# Patient Record
Sex: FEMALE
[De-identification: names, ages, dates, MRNs, and addresses within clinical notes are randomized per-mention and may not be internally consistent; named-entity substitution may affect disease eponyms.]

---

## 2019-02-11 PROBLEM — Z00.00 ENCOUNTER FOR PREVENTIVE HEALTH EXAMINATION: Status: ACTIVE | Noted: 2019-02-11

## 2019-03-15 ENCOUNTER — APPOINTMENT (OUTPATIENT)
Dept: ENDOCRINOLOGY | Facility: CLINIC | Age: 24
End: 2019-03-15

## 2019-04-29 ENCOUNTER — APPOINTMENT (OUTPATIENT)
Dept: ENDOCRINOLOGY | Facility: CLINIC | Age: 24
End: 2019-04-29
Payer: COMMERCIAL

## 2019-04-29 VITALS
HEART RATE: 76 BPM | HEIGHT: 64 IN | SYSTOLIC BLOOD PRESSURE: 116 MMHG | WEIGHT: 193 LBS | DIASTOLIC BLOOD PRESSURE: 80 MMHG | BODY MASS INDEX: 32.95 KG/M2

## 2019-04-29 DIAGNOSIS — Z87.39 PERSONAL HISTORY OF OTHER DISEASES OF THE MUSCULOSKELETAL SYSTEM AND CONNECTIVE TISSUE: ICD-10-CM

## 2019-04-29 DIAGNOSIS — E28.2 POLYCYSTIC OVARIAN SYNDROME: ICD-10-CM

## 2019-04-29 DIAGNOSIS — Z87.891 PERSONAL HISTORY OF NICOTINE DEPENDENCE: ICD-10-CM

## 2019-04-29 PROCEDURE — 99204 OFFICE O/P NEW MOD 45 MIN: CPT

## 2019-04-29 RX ORDER — SPIRONOLACTONE 50 MG/1
TABLET ORAL
Refills: 0 | Status: ACTIVE | COMMUNITY

## 2019-04-29 RX ORDER — BIOTIN 10 MG
TABLET ORAL
Refills: 0 | Status: ACTIVE | COMMUNITY

## 2019-04-29 RX ORDER — ASCORBIC ACID 500 MG
TABLET ORAL
Refills: 0 | Status: ACTIVE | COMMUNITY

## 2019-04-29 RX ORDER — BUPROPION HYDROCHLORIDE 150 MG/1
150 TABLET, EXTENDED RELEASE ORAL
Qty: 60 | Refills: 2 | Status: ACTIVE | COMMUNITY
Start: 2019-04-29 | End: 1900-01-01

## 2019-04-30 LAB
ALBUMIN SERPL ELPH-MCNC: 4.8 G/DL
ALP BLD-CCNC: 70 U/L
ALT SERPL-CCNC: 18 U/L
ANION GAP SERPL CALC-SCNC: 13 MMOL/L
AST SERPL-CCNC: 19 U/L
BASOPHILS # BLD AUTO: 0.06 K/UL
BASOPHILS NFR BLD AUTO: 0.9 %
BILIRUB SERPL-MCNC: 0.4 MG/DL
BUN SERPL-MCNC: 10 MG/DL
CALCIUM SERPL-MCNC: 10.1 MG/DL
CHLORIDE SERPL-SCNC: 102 MMOL/L
CHOLEST SERPL-MCNC: 267 MG/DL
CHOLEST/HDLC SERPL: 4.6 RATIO
CO2 SERPL-SCNC: 25 MMOL/L
CREAT SERPL-MCNC: 0.81 MG/DL
EOSINOPHIL # BLD AUTO: 0.06 K/UL
EOSINOPHIL NFR BLD AUTO: 0.9 %
ESTIMATED AVERAGE GLUCOSE: 94 MG/DL
GLUCOSE SERPL-MCNC: 85 MG/DL
HBA1C MFR BLD HPLC: 4.9 %
HCT VFR BLD CALC: 47 %
HDLC SERPL-MCNC: 58 MG/DL
HGB BLD-MCNC: 15.6 G/DL
IMM GRANULOCYTES NFR BLD AUTO: 0.2 %
LDLC SERPL CALC-MCNC: 192 MG/DL
LYMPHOCYTES # BLD AUTO: 2.79 K/UL
LYMPHOCYTES NFR BLD AUTO: 43.7 %
MAN DIFF?: NORMAL
MCHC RBC-ENTMCNC: 29.5 PG
MCHC RBC-ENTMCNC: 33.2 GM/DL
MCV RBC AUTO: 89 FL
MONOCYTES # BLD AUTO: 0.41 K/UL
MONOCYTES NFR BLD AUTO: 6.4 %
NEUTROPHILS # BLD AUTO: 3.06 K/UL
NEUTROPHILS NFR BLD AUTO: 47.9 %
PLATELET # BLD AUTO: 355 K/UL
POTASSIUM SERPL-SCNC: 4.6 MMOL/L
PROLACTIN SERPL-MCNC: 6.2 NG/ML
PROT SERPL-MCNC: 7.1 G/DL
RBC # BLD: 5.28 M/UL
RBC # FLD: 12.7 %
SODIUM SERPL-SCNC: 140 MMOL/L
T4 FREE SERPL-MCNC: 1.5 NG/DL
TESTOST SERPL-MCNC: 54.4 NG/DL
TRIGL SERPL-MCNC: 83 MG/DL
TSH SERPL-ACNC: 1.54 UIU/ML
WBC # FLD AUTO: 6.39 K/UL

## 2019-04-30 RX ORDER — NORGESTIMATE AND ETHINYL ESTRADIOL 0.25-0.035
0.25-35 KIT ORAL DAILY
Qty: 3 | Refills: 3 | Status: ACTIVE | COMMUNITY
Start: 1900-01-01 | End: 1900-01-01

## 2019-05-03 LAB — 17OHP SERPL-MCNC: 59 NG/DL

## 2019-05-06 LAB — DHEA-SULFATE, SERUM: 347 UG/DL

## 2019-05-06 NOTE — HISTORY OF PRESENT ILLNESS
[FreeTextEntry1] : Ms. Mckoy is a 23 year-old woman with a history of polycystic ovarian syndrome and elevated body mass index presenting to establish care with me.\par \par Polycystic ovarian syndrome.\par She was diagnosed with polycystic ovarian syndrome in high school in the setting of oligomenorrhea, hirsutism, acne.\par She has been on a combined oral contraceptive pill for a few years. She has been on Sprintec for the past year. She has not been regular with use since she is not sexually active but has been compliant over the past two months and has had regular withdrawal bleeding.\par She has been on spironolactone once a day for about a year (does not remember dose) for hirsutism and acne. She shaves hirsutism above lip and chin. She was previously on minocycline for acne. Some benefit but still continued acne with spironolactone. She is seeing her dermatologist soon. \par \par Elevated body mass index.\par She weighed up to 140 pounds the first few years of high school, then up to 150 pounds prior to graduation.\par She gained weight during college. Her weight was up to 200 pounds, decreased with lifestyle modifications.\par She was previously on metformin for weight loss but did not tolerate.\par 24 hour diet recall: breakfast, coffee, will have a One bar; lunch, Poke bowl; dinner Lobster bisque, steak (graduation)\par Exercise: Walking around campus; planning to run around the [facility]\par \par She graduated yesterday. She will be studying special education/general education in graduate school at [employer] at [place].

## 2019-05-06 NOTE — ADDENDUM
[FreeTextEntry1] : Recent test results as below. Hemoglobin/hematocrit are borderline elevated; question if due to hemoconcentration. Recommend follow-up with primary care. LDL is elevated and may improve with weight loss. Due to age, statin therapy not indicated. Testosterone remains elevated on oral contraceptive pill and spironolactone; recommend increase in dose. Other test results within range. Await 17-hydroxyprogesterone and DHEAS to discuss with Ms. Mckoy. 4/30/19\par \par 17-hydroxyprogesterone and DHEAS within range. I left a message and will send a letter to Ms. Mckoy with results. 5/06/19

## 2019-05-06 NOTE — ASSESSMENT
[FreeTextEntry1] : Polycystic ovarian syndrome. She meets criteria for polycystic ovarian syndrome in the setting of irregular periods and clinical evidence of hyperandrogenism. We discussed evaluation for secondary causes of oligomenorrhea. We discussed that first line therapy for polycystic ovarian syndrome is a combined oral contraceptive pill. She is taking an oral contraceptive pill with norethindrone acetate, with moderate androgenic activity but low thromboembolic risk. We can adjust spironolactone to twice daily for treatment of acne. We discussed that weight loss should improve her symptoms. \par Laboratory testing as below\par Continue Sprintec\par Adjust dose of spironolactone to twice daily pending potassium level\par \par Elevated body mass index. We reviewed lifestyle modifications for weight loss. We discussed referral to nutrition. We discussed pharmacologic options for weight loss. She is amenable to bupropion. We discussed the risks and benefits, including but not limited to nausea, headache, dizziness, decrease in seizure threshold.\par Laboratory testing as below\par Lifestyle modification\par Referral to nutrition\par Start bupropion  mg daily for one week, then 300 mg daily\par \par Return to see me in 3 months.

## 2019-05-06 NOTE — PHYSICAL EXAM
[Alert] : alert [No Acute Distress] : no acute distress [Healthy Appearance] : healthy appearance [Normal Sclera/Conjunctiva] : normal sclera/conjunctiva [Normal Oropharynx] : the oropharynx was normal [No Neck Mass] : no neck mass was observed [Supple] : the neck was supple [No LAD] : no lymphadenopathy [Thyroid Not Enlarged] : the thyroid was not enlarged [No Thyroid Nodules] : there were no palpable thyroid nodules [Normal Rate and Effort] : normal respiratory rhythm and effort [Clear to Auscultation] : lungs were clear to auscultation bilaterally [Normal Rate] : heart rate was normal  [Normal S1, S2] : normal S1 and S2 [Regular Rhythm] : with a regular rhythm [No Stigmata of Cushings Syndrome] : no stigmata of cushings syndrome [Normal Insight/Judgement] : insight and judgment were intact [Kyphosis] : no kyphosis present [Acanthosis Nigricans] : no acanthosis nigricans [de-identified] : no moon facies, no supraclavicular fat pads

## 2019-08-01 ENCOUNTER — APPOINTMENT (OUTPATIENT)
Dept: ENDOCRINOLOGY | Facility: CLINIC | Age: 24
End: 2019-08-01